# Patient Record
Sex: FEMALE | Race: OTHER
[De-identification: names, ages, dates, MRNs, and addresses within clinical notes are randomized per-mention and may not be internally consistent; named-entity substitution may affect disease eponyms.]

---

## 2019-06-11 ENCOUNTER — HOSPITAL ENCOUNTER (EMERGENCY)
Dept: HOSPITAL 72 - EMR | Age: 39
LOS: 1 days | Discharge: HOME | End: 2019-06-12
Payer: SELF-PAY

## 2019-06-11 VITALS — DIASTOLIC BLOOD PRESSURE: 74 MMHG | SYSTOLIC BLOOD PRESSURE: 130 MMHG

## 2019-06-11 VITALS — BODY MASS INDEX: 22.5 KG/M2 | WEIGHT: 140 LBS | HEIGHT: 66 IN

## 2019-06-11 VITALS — SYSTOLIC BLOOD PRESSURE: 118 MMHG | DIASTOLIC BLOOD PRESSURE: 78 MMHG

## 2019-06-11 VITALS — SYSTOLIC BLOOD PRESSURE: 122 MMHG | DIASTOLIC BLOOD PRESSURE: 82 MMHG

## 2019-06-11 DIAGNOSIS — E86.0: Primary | ICD-10-CM

## 2019-06-11 DIAGNOSIS — N39.0: ICD-10-CM

## 2019-06-11 LAB
ADD MANUAL DIFF: NO
ALBUMIN SERPL-MCNC: 4.5 G/DL (ref 3.4–5)
ALBUMIN/GLOB SERPL: 1.9 {RATIO} (ref 1–2.7)
ALP SERPL-CCNC: 53 U/L (ref 46–116)
ALT SERPL-CCNC: 29 U/L (ref 12–78)
ANION GAP SERPL CALC-SCNC: 12 MMOL/L (ref 5–15)
APPEARANCE UR: CLEAR
APTT PPP: YELLOW S
AST SERPL-CCNC: 37 U/L (ref 15–37)
BASOPHILS NFR BLD AUTO: 1.2 % (ref 0–2)
BILIRUB SERPL-MCNC: 0.7 MG/DL (ref 0.2–1)
BUN SERPL-MCNC: 8 MG/DL (ref 7–18)
CALCIUM SERPL-MCNC: 9.5 MG/DL (ref 8.5–10.1)
CHLORIDE SERPL-SCNC: 106 MMOL/L (ref 98–107)
CK SERPL-CCNC: 595 U/L (ref 26–308)
CO2 SERPL-SCNC: 23 MMOL/L (ref 21–32)
CREAT SERPL-MCNC: 0.7 MG/DL (ref 0.55–1.3)
EOSINOPHIL NFR BLD AUTO: 0.2 % (ref 0–3)
ERYTHROCYTE [DISTWIDTH] IN BLOOD BY AUTOMATED COUNT: 10.6 % (ref 11.6–14.8)
GLOBULIN SER-MCNC: 2.4 G/DL
GLUCOSE UR STRIP-MCNC: NEGATIVE MG/DL
HCT VFR BLD CALC: 40.9 % (ref 37–47)
HGB BLD-MCNC: 14.1 G/DL (ref 12–16)
KETONES UR QL STRIP: (no result)
LEUKOCYTE ESTERASE UR QL STRIP: (no result)
LYMPHOCYTES NFR BLD AUTO: 9.5 % (ref 20–45)
MCV RBC AUTO: 93 FL (ref 80–99)
MONOCYTES NFR BLD AUTO: 8 % (ref 1–10)
NEUTROPHILS NFR BLD AUTO: 81 % (ref 45–75)
NITRITE UR QL STRIP: NEGATIVE
PH UR STRIP: 6 [PH] (ref 4.5–8)
PLATELET # BLD: 282 K/UL (ref 150–450)
POTASSIUM SERPL-SCNC: 3.5 MMOL/L (ref 3.5–5.1)
PROT UR QL STRIP: (no result)
RBC # BLD AUTO: 4.41 M/UL (ref 4.2–5.4)
SODIUM SERPL-SCNC: 141 MMOL/L (ref 136–145)
SP GR UR STRIP: 1.01 (ref 1–1.03)
UROBILINOGEN UR-MCNC: NORMAL MG/DL (ref 0–1)
WBC # BLD AUTO: 15.3 K/UL (ref 4.8–10.8)

## 2019-06-11 PROCEDURE — 81003 URINALYSIS AUTO W/O SCOPE: CPT

## 2019-06-11 PROCEDURE — 80307 DRUG TEST PRSMV CHEM ANLYZR: CPT

## 2019-06-11 PROCEDURE — 96372 THER/PROPH/DIAG INJ SC/IM: CPT

## 2019-06-11 PROCEDURE — 80053 COMPREHEN METABOLIC PANEL: CPT

## 2019-06-11 PROCEDURE — 80329 ANALGESICS NON-OPIOID 1 OR 2: CPT

## 2019-06-11 PROCEDURE — 99284 EMERGENCY DEPT VISIT MOD MDM: CPT

## 2019-06-11 PROCEDURE — 73630 X-RAY EXAM OF FOOT: CPT

## 2019-06-11 PROCEDURE — 84703 CHORIONIC GONADOTROPIN ASSAY: CPT

## 2019-06-11 PROCEDURE — 96375 TX/PRO/DX INJ NEW DRUG ADDON: CPT

## 2019-06-11 PROCEDURE — 36415 COLL VENOUS BLD VENIPUNCTURE: CPT

## 2019-06-11 PROCEDURE — 85025 COMPLETE CBC W/AUTO DIFF WBC: CPT

## 2019-06-11 PROCEDURE — 82550 ASSAY OF CK (CPK): CPT

## 2019-06-11 PROCEDURE — 96365 THER/PROPH/DIAG IV INF INIT: CPT

## 2019-06-11 PROCEDURE — 93005 ELECTROCARDIOGRAM TRACING: CPT

## 2019-06-11 PROCEDURE — 87086 URINE CULTURE/COLONY COUNT: CPT

## 2019-06-11 NOTE — EMERGENCY ROOM REPORT
History of Present Illness


General


Chief Complaint:  General Complaint


Source:  Medical Record





Present Illness


HPI


38-year-old female with unknown past medical history brought in by the 

paramedics as she was laying on the street screaming that something is wrong 

and she is anxious.  She appears very calm and sleeping as I examine her she 

takes a few minutes to respond to questions however she does respond to 

questions such as you have any allergies or to take any medication.  Patient 

denies any injury, chest pain, palpitation, shortness of breath, drug use, 

smoking.  Patient denies taking any daily medication.  Otherwise she is a poor 

historian.also complains of left foot pain no injury.


Allergies:  


Coded Allergies:  


     No Known Allergies (Unverified , 6/11/19)





Patient History


Past Medical History:  see triage record


Past Surgical History:  unable to obtain


Pertinent Family History:  unable to obtain


Pregnant Now:  No - DONT REMEMBER


Immunizations:  other


Reviewed Nursing Documentation:  PMH: Agreed; PSxH: Agreed





Nursing Documentation-PMH


Past Medical History:  No History, Except For


History Of Psychiatric Problem:  Yes





Review of Systems


All Other Systems:  negative except mentioned in HPI





Physical Exam





Vital Signs








  Date Time  Temp Pulse Resp B/P (MAP) Pulse Ox O2 Delivery O2 Flow Rate FiO2


 


6/11/19 14:37  85 15 118/78 (91) 98 Room Air  


 


6/11/19 15:00 98.6       








Sp02 EP Interpretation:  reviewed, normal


General Appearance:  alert, lethargic


Eyes:  bilateral eye abnormal pupil


ENT:  normal ENT inspection


Neck:  normal inspection, full range of motion, supple


Respiratory:  normal inspection, chest non-tender, normal breath sounds, no 

wheezing


Cardiovascular #1:  normal inspection, regular rate, rhythm, no edema, no murmur

, normal capillary refill


Gastrointestinal:  normal inspection, non tender


Rectal:  deferred


Genitourinary:  no CVA tenderness


Musculoskeletal:  normal inspection, back normal


Neurologic:  alert, responsive, sensory intact


Psychiatric:  depressed affect, other - extreme fatigue, unknown substance use


Suicide Risk Assessment:  


   Suicidal Ideation:  No


   Had intent to initiate attempt:  No


   Pt's plan for suicide attempt:  No


   Has means to complete attempt:  No


Skin:  normal inspection, normal color, no rash


Lymphatic:  normal inspection, no adenopathy





Medical Decision Making


PA Attestation


All my diagnosis and treatment plans were reviewed ad discussed with my 

supervising physician Dr. Stewart


Homeless Attestation


the treating Dr Stewart  has assessed and agrees that patient is medically 

stable for discharge to an outpatient disposition


Diagnostic Impression:  


 Primary Impression:  


 Dehydration


 Additional Impression:  


 UTI (urinary tract infection)


ER Course


38-year-old female with unknown past medical history brought in by the 

paramedics as she was laying on the street screaming that something is wrong 

and she is anxious.  She appears very calm and sleeping as I examine her she 

takes a few minutes to respond to questions however she does respond to 

questions such as you have any allergies or to take any medication.  Patient 

denies any injury, chest pain, palpitation, shortness of breath, drug use, 

smoking.  Patient denies taking any daily medication.  Otherwise she is a poor 

historian.





Ddx considered but are not limited to: generalized anxiety disorder, panic 

attack, depression with psycotic featurs, bipolar disorder, drug overdose 














Vital signs: are WNL, pt. is afebrile








 H&PE are most consistent with: UTI, dehydration














ORDERS: Psychiatric panel, Benadryl, Rocephin











ED INTERVENTIONS: Benadryl




















DISCHARGE: At this time pt. is stable for d/c to home. Will provide printed 

patient care instructions, and any necessary prescriptions. Care plan and 

follow up instructions have been discussed with the patient prior to discharge.


wbc 15, UA: pos leuk, CK: 500


EKG Diagnostic Results


Rate:  normal


Rhythm:  NSR


ST Segments:  no acute changes





Last Vital Signs








  Date Time  Temp Pulse Resp B/P (MAP) Pulse Ox O2 Delivery O2 Flow Rate FiO2


 


6/11/19 15:00  85 15   Room Air  


 


6/11/19 15:00 98.6   118/78 98   








Disposition:  HOME, SELF-CARE


Condition:  Stable


Referrals:  


NOT CHOSEN IPA/MD,REFERRING (PCP)


Patient Instructions:  Dehydration, Adult, Easy-to-Read, Urinary Tract Infection

, Easy-to-Read











Mckenzie Dunham Jun 11, 2019 16:18

## 2019-06-12 VITALS — DIASTOLIC BLOOD PRESSURE: 82 MMHG | SYSTOLIC BLOOD PRESSURE: 112 MMHG

## 2019-06-12 VITALS — SYSTOLIC BLOOD PRESSURE: 112 MMHG | DIASTOLIC BLOOD PRESSURE: 82 MMHG

## 2019-06-12 VITALS — SYSTOLIC BLOOD PRESSURE: 111 MMHG | DIASTOLIC BLOOD PRESSURE: 69 MMHG

## 2019-06-12 VITALS — SYSTOLIC BLOOD PRESSURE: 109 MMHG | DIASTOLIC BLOOD PRESSURE: 79 MMHG

## 2019-06-12 NOTE — DIAGNOSTIC IMAGING REPORT
Indication: Left foot pain

 

Technique: 3 views left foot

 

Comparison: none

 

Findings: No acute fractures. No dislocations. The joint spaces are preserved

 

Impression: Negative

## 2019-06-12 NOTE — CARDIOLOGY REPORT
--------------- APPROVED REPORT --------------





EKG Measurement

Heart [id number]

WI [id number]

[id number]

[id number]

[id number]





Sinus rhythm with short WI

Otherwise normal ECG